# Patient Record
Sex: FEMALE | URBAN - METROPOLITAN AREA
[De-identification: names, ages, dates, MRNs, and addresses within clinical notes are randomized per-mention and may not be internally consistent; named-entity substitution may affect disease eponyms.]

---

## 2022-10-03 ENCOUNTER — APPOINTMENT (RX ONLY)
Dept: URBAN - METROPOLITAN AREA CLINIC 42 | Facility: CLINIC | Age: 72
Setting detail: DERMATOLOGY
End: 2022-10-03

## 2022-10-03 DIAGNOSIS — L57.8 OTHER SKIN CHANGES DUE TO CHRONIC EXPOSURE TO NONIONIZING RADIATION: ICD-10-CM

## 2022-10-03 DIAGNOSIS — R20.2 PARESTHESIA OF SKIN: ICD-10-CM | Status: INADEQUATELY CONTROLLED

## 2022-10-03 DIAGNOSIS — L82.1 OTHER SEBORRHEIC KERATOSIS: ICD-10-CM | Status: STABLE

## 2022-10-03 PROCEDURE — 99203 OFFICE O/P NEW LOW 30 MIN: CPT

## 2022-10-03 PROCEDURE — ? OTHER

## 2022-10-03 PROCEDURE — ? COUNSELING

## 2022-10-03 PROCEDURE — ? DIAGNOSIS COMMENT

## 2022-10-03 ASSESSMENT — LOCATION DETAILED DESCRIPTION DERM
LOCATION DETAILED: INFERIOR THORACIC SPINE
LOCATION DETAILED: LEFT MEDIAL BREAST 8-9:00 REGION

## 2022-10-03 ASSESSMENT — LOCATION SIMPLE DESCRIPTION DERM
LOCATION SIMPLE: LEFT BREAST
LOCATION SIMPLE: UPPER BACK

## 2022-10-03 ASSESSMENT — LOCATION ZONE DERM: LOCATION ZONE: TRUNK

## 2022-10-03 NOTE — PROCEDURE: OTHER
Other (Free Text): Chronic condition -- Stable\\n\\nPlan:\\nDiscussed the benign nature of these growths with the patient and her son\\nReviewed she will continue to develop these growths \\nPatient and son acknowledged their understanding\\Florinda questions answered
Render Risk Assessment In Note?: no
Detail Level: Zone
Note Text (......Xxx Chief Complaint.): This diagnosis correlates with the
Other (Free Text): Chronic condition -- Uncontrolled \\nDDx - EDP, LPP, PIH \\n\\nPlan:\\n- Discussed this condition is secondary to spinal arthritis and resultant irritation on nerves leading to pruritus and itching \\n- Discussed the use of topical anti-itching lotions liberally throughout the day (e.g. Sarna lotion, CeraVe anti-lotion) \\n- Discussed the use of keratolytic products (Naturium; AmLactin)\\n- Reviewed option for cosmetic treatments in future \\n- R/B/SE of OTC medications discussed \\n- All questions answered

## 2022-10-03 NOTE — PROCEDURE: DIAGNOSIS COMMENT
Render Risk Assessment In Note?: yes
Comment: Pt has history of arthritis (managed by PCP - Dr Shah) \\nPt denies using any essential oils \\nPt uses aquaphor\\nRecommended \\n- CeraVe anti itch lotion \\n- Sarna lotion (recommended to put in fridge) \\nBody wash \\n- Naturium (Salicylic acid) \\nDaily moisturizing\\n- Amlactin lotion
Detail Level: Simple

## 2022-10-03 NOTE — HPI: SKIN LESION
Is This A New Presentation, Or A Follow-Up?: Skin Lesion
Additional History: Pt tried aquaphor with some relief